# Patient Record
Sex: MALE | Race: WHITE | NOT HISPANIC OR LATINO | URBAN - METROPOLITAN AREA
[De-identification: names, ages, dates, MRNs, and addresses within clinical notes are randomized per-mention and may not be internally consistent; named-entity substitution may affect disease eponyms.]

---

## 2020-10-03 ENCOUNTER — EMERGENCY (EMERGENCY)
Facility: HOSPITAL | Age: 60
LOS: 1 days | Discharge: HOME | End: 2020-10-03
Attending: INTERNAL MEDICINE | Admitting: INTERNAL MEDICINE
Payer: COMMERCIAL

## 2020-10-03 VITALS
TEMPERATURE: 98 F | OXYGEN SATURATION: 97 % | DIASTOLIC BLOOD PRESSURE: 82 MMHG | RESPIRATION RATE: 16 BRPM | WEIGHT: 186.95 LBS | SYSTOLIC BLOOD PRESSURE: 154 MMHG | HEART RATE: 59 BPM

## 2020-10-03 DIAGNOSIS — S61.011A LACERATION WITHOUT FOREIGN BODY OF RIGHT THUMB WITHOUT DAMAGE TO NAIL, INITIAL ENCOUNTER: ICD-10-CM

## 2020-10-03 PROCEDURE — 90471 IMMUNIZATION ADMIN: CPT

## 2020-10-03 PROCEDURE — 12002 RPR S/N/AX/GEN/TRNK2.6-7.5CM: CPT

## 2020-10-03 PROCEDURE — 90715 TDAP VACCINE 7 YRS/> IM: CPT

## 2020-10-03 PROCEDURE — 12002 RPR S/N/AX/GEN/TRNK2.6-7.5CM: CPT | Mod: F5,F6

## 2020-10-03 PROCEDURE — 99283 EMERGENCY DEPT VISIT LOW MDM: CPT | Mod: 25

## 2020-10-03 RX ORDER — CEPHALEXIN 500 MG
500 CAPSULE ORAL ONCE
Refills: 0 | Status: COMPLETED | OUTPATIENT
Start: 2020-10-03 | End: 2020-10-03

## 2020-10-03 RX ORDER — TETANUS TOXOID, REDUCED DIPHTHERIA TOXOID AND ACELLULAR PERTUSSIS VACCINE, ADSORBED 5; 2.5; 8; 8; 2.5 [IU]/.5ML; [IU]/.5ML; UG/.5ML; UG/.5ML; UG/.5ML
0.5 SUSPENSION INTRAMUSCULAR ONCE
Refills: 0 | Status: COMPLETED | OUTPATIENT
Start: 2020-10-03 | End: 2020-10-03

## 2020-10-03 RX ORDER — CEPHALEXIN 500 MG
1 CAPSULE ORAL
Qty: 14 | Refills: 0
Start: 2020-10-03 | End: 2020-10-09

## 2020-10-03 RX ADMIN — TETANUS TOXOID, REDUCED DIPHTHERIA TOXOID AND ACELLULAR PERTUSSIS VACCINE, ADSORBED 0.5 MILLILITER(S): 5; 2.5; 8; 8; 2.5 SUSPENSION INTRAMUSCULAR at 21:27

## 2020-10-03 RX ADMIN — Medication 500 MILLIGRAM(S): at 22:31

## 2020-10-03 NOTE — ED PROVIDER NOTE - OBJECTIVE STATEMENT
60 y/o M h/o HTN pw laceration to the right 1st and 2nd digits obtained today at 4pm s/p use of a power tool. Denies weakness, numbness, tingling. Able to flex and extend fingers without difficulty. Last Tdap- unknown.

## 2020-10-03 NOTE — ED ADULT NURSE NOTE - NSIMPLEMENTINTERV_GEN_ALL_ED
Implemented All Universal Safety Interventions:  [place] to call system. Call bell, personal items and telephone within reach. Instruct patient to call for assistance. Room bathroom lighting operational. Non-slip footwear when patient is off stretcher. Physically safe environment: no spills, clutter or unnecessary equipment. Stretcher in lowest position, wheels locked, appropriate side rails in place.

## 2020-10-03 NOTE — ED PROVIDER NOTE - CARE PLAN
Principal Discharge DX:	Laceration of thumb without foreign body without damage to nail, unspecified laterality, initial encounter

## 2020-10-03 NOTE — ED PROVIDER NOTE - ATTENDING CONTRIBUTION TO CARE
58 y/o M h/o HTN pw laceration to the right 1st and 2nd digits obtained today at 4pm s/p use of a power tool. Denies weakness, numbness, tingling. Able to flex and extend fingers without difficulty. Last Tdap- unknown. Will repair laceration, give Tdap and start Keflex prophylaxis.  **laceration sutured   Dr. Powell:  I have reviewed and discussed with the PA/ resident the case specifics, including the history, physical assessment, evaluation, conclusion, laboratory results, and medical plan. I agree with the contents, and conclusions. I have personally examined, and interviewed the patient.

## 2020-10-03 NOTE — ED ADULT NURSE NOTE - OBJECTIVE STATEMENT
59yr old male walked into ED c/o laceration to right 1st and 2nd finger; states he grabbed a power tool at home the wrong way; site clean and dry; insure of last tetanus

## 2020-10-03 NOTE — ED PROVIDER NOTE - CLINICAL SUMMARY MEDICAL DECISION MAKING FREE TEXT BOX
58 y/o M h/o HTN pw laceration to the right 1st and 2nd digits obtained today at 4pm s/p use of a power tool. Denies weakness, numbness, tingling. Able to flex and extend fingers without difficulty. Last Tdap- unknown. Will repair laceration, give Tdap and start Keflex prophylaxis. 60 y/o M h/o HTN pw laceration to the right 1st and 2nd digits obtained today at 4pm s/p use of a power tool. Denies weakness, numbness, tingling. Able to flex and extend fingers without difficulty. Last Tdap- unknown. Will repair laceration, give Tdap and start Keflex prophylaxis.  **laceration sutured by Dr. Powell

## 2020-10-03 NOTE — ED PROVIDER NOTE - PATIENT PORTAL LINK FT
You can access the FollowMyHealth Patient Portal offered by [facility] by registering at the following website: [email or web address]. By joining [facility]’s FollowMyHealth portal, you will also be able to view your health information using other applications (apps) compatible with our system.

## 2020-10-03 NOTE — ED PROVIDER NOTE - NEURO NEGATIVE STATEMENT, MLM
Detail Level: Detailed Post-Care Instructions: I reviewed with the patient in detail post-care instructions. Patient is to wear sunprotection, and avoid picking at any of the treated lesions. Pt may apply Vaseline to crusted or scabbing areas. Duration Of Freeze Thaw-Cycle (Seconds): 2 Render Post-Care Instructions In Note?: no Consent: The patient's consent was obtained including but not limited to risks of crusting, scabbing, blistering, scarring, darker or lighter pigmentary change, recurrence, incomplete removal and infection. no loss of consciousness, no gait abnormality, no headache, no sensory deficits, and no weakness. Number Of Freeze-Thaw Cycles: 1 freeze-thaw cycle electronic

## 2020-10-03 NOTE — ED PROVIDER NOTE - NSFOLLOWUPINSTRUCTIONS_ED_ALL_ED_FT
Follow up with your PMD within 48-72 hours for wound check.   Keep sutures covered and dry for 24 hours then clean with soap and water daily, apply bacitracin and cover.  Return to the emergency department for suture removal in 7-10 days.   Take Tylenol 650mg every 4-6 hours as needed for pain   Any increased pain, surrounding redness, streaking (red lines), pus, drainage, swelling, fever, chills OR ANY NEW CONCERNING symptoms return to the emergency department.   You received a Tdap (tetanus, diphtheria and pertussis) shot today.    Laceration    WHAT YOU NEED TO KNOW:    A laceration is an injury to the skin and the soft tissue underneath it. Lacerations can happen anywhere on the body.    DISCHARGE INSTRUCTIONS:    Seek care immediately if:     You have heavy bleeding or bleeding that does not stop after 10 minutes of holding firm, direct pressure over the wound.      Your wound opens up.    Call your doctor if:     You have a fever or chills.      Your laceration is red, warm, or swollen.      You have red streaks on your skin coming from your wound.      You have white or yellow drainage from the wound that smells bad.      You have pain that gets worse, even after treatment.      You have questions or concerns about your condition or care.    Medicines: You may need any of the following:     Prescription pain medicine may be given. Ask your healthcare provider how to take this medicine safely. Some prescription pain medicines contain acetaminophen. Do not take other medicines that contain acetaminophen without talking to your healthcare provider. Too much acetaminophen may cause liver damage. Prescription pain medicine may cause constipation. Ask your healthcare provider how to prevent or treat constipation.       Antibiotics help treat or prevent a bacterial infection.      Take your medicine as directed. Contact your healthcare provider if you think your medicine is not helping or if you have side effects. Tell him or her if you are allergic to any medicine. Keep a list of the medicines, vitamins, and herbs you take. Include the amounts, and when and why you take them. Bring the list or the pill bottles to follow-up visits. Carry your medicine list with you in case of an emergency.    Care for your wound as directed:     Do not get your wound wet until your healthcare provider says it is okay. Do not soak your wound in water. Do not go swimming until your healthcare provider says it is okay. Carefully wash the wound with soap and water. Gently pat the area dry or allow it to air dry.      Change your bandages when they get wet, dirty, or after washing. Apply new, clean bandages as directed. Do not apply elastic bandages or tape too tight. Do not put powders or lotions over your incision.      Apply antibiotic ointment as directed. Your healthcare provider may give you antibiotic ointment to put over your wound if you have stitches. If you have strips of tape over your incision, let them dry up and fall off on their own. If they do not fall off within 14 days, gently remove them. If you have glue over your wound, do not remove or pick at it. If your glue comes off, do not replace it with glue that you have at home.      Check your wound every day for signs of infection, such as swelling, redness, or pus.    Self-care:     Apply ice on your wound for 15 to 20 minutes every hour or as directed. Use an ice pack, or put crushed ice in a plastic bag. Cover it with a towel. Ice helps prevent tissue damage and decreases swelling and pain.      Use a splint as directed. A splint will decrease movement and stress on your wound. It may help it heal faster. A splint may be used for lacerations over joints or areas of your body that bend. Ask your healthcare provider how to apply and remove a splint.      Decrease scarring of your wound by applying ointments as directed. Do not apply ointments until your healthcare provider says it is okay. You may need to wait until your wound is healed. Ask which ointment to buy and how often to use it. After your wound is healed, use sunscreen over the area when you are out in the sun. You should do this for at least 6 months to 1 year after your injury.    Follow up with your doctor as directed: You will need to return in 3 to 14 days to have stitches or staples removed. Write down your questions so you remember to ask them during your visits Follow up with your PMD within 48-72 hours for wound check.   Keep sutures covered and dry for 24 hours then clean with soap and water daily, apply bacitracin and cover.  Return to the emergency department for suture removal in 7-10 days.   Take Keflex 500mg 1 tab 2x/day for 7 days to prevent infection.   Take Tylenol 650mg every 4-6 hours as needed for pain   Any increased pain, surrounding redness, streaking (red lines), pus, drainage, swelling, fever, chills OR ANY NEW CONCERNING symptoms return to the emergency department.   You received a Tdap (tetanus, diphtheria and pertussis) shot today.    Laceration    WHAT YOU NEED TO KNOW:    A laceration is an injury to the skin and the soft tissue underneath it. Lacerations can happen anywhere on the body.    DISCHARGE INSTRUCTIONS:    Seek care immediately if:     You have heavy bleeding or bleeding that does not stop after 10 minutes of holding firm, direct pressure over the wound.      Your wound opens up.    Call your doctor if:     You have a fever or chills.      Your laceration is red, warm, or swollen.      You have red streaks on your skin coming from your wound.      You have white or yellow drainage from the wound that smells bad.      You have pain that gets worse, even after treatment.      You have questions or concerns about your condition or care.    Medicines: You may need any of the following:     Prescription pain medicine may be given. Ask your healthcare provider how to take this medicine safely. Some prescription pain medicines contain acetaminophen. Do not take other medicines that contain acetaminophen without talking to your healthcare provider. Too much acetaminophen may cause liver damage. Prescription pain medicine may cause constipation. Ask your healthcare provider how to prevent or treat constipation.       Antibiotics help treat or prevent a bacterial infection.      Take your medicine as directed. Contact your healthcare provider if you think your medicine is not helping or if you have side effects. Tell him or her if you are allergic to any medicine. Keep a list of the medicines, vitamins, and herbs you take. Include the amounts, and when and why you take them. Bring the list or the pill bottles to follow-up visits. Carry your medicine list with you in case of an emergency.    Care for your wound as directed:     Do not get your wound wet until your healthcare provider says it is okay. Do not soak your wound in water. Do not go swimming until your healthcare provider says it is okay. Carefully wash the wound with soap and water. Gently pat the area dry or allow it to air dry.      Change your bandages when they get wet, dirty, or after washing. Apply new, clean bandages as directed. Do not apply elastic bandages or tape too tight. Do not put powders or lotions over your incision.      Apply antibiotic ointment as directed. Your healthcare provider may give you antibiotic ointment to put over your wound if you have stitches. If you have strips of tape over your incision, let them dry up and fall off on their own. If they do not fall off within 14 days, gently remove them. If you have glue over your wound, do not remove or pick at it. If your glue comes off, do not replace it with glue that you have at home.      Check your wound every day for signs of infection, such as swelling, redness, or pus.    Self-care:     Apply ice on your wound for 15 to 20 minutes every hour or as directed. Use an ice pack, or put crushed ice in a plastic bag. Cover it with a towel. Ice helps prevent tissue damage and decreases swelling and pain.      Use a splint as directed. A splint will decrease movement and stress on your wound. It may help it heal faster. A splint may be used for lacerations over joints or areas of your body that bend. Ask your healthcare provider how to apply and remove a splint.      Decrease scarring of your wound by applying ointments as directed. Do not apply ointments until your healthcare provider says it is okay. You may need to wait until your wound is healed. Ask which ointment to buy and how often to use it. After your wound is healed, use sunscreen over the area when you are out in the sun. You should do this for at least 6 months to 1 year after your injury.    Follow up with your doctor as directed: You will need to return in 3 to 14 days to have stitches or staples removed. Write down your questions so you remember to ask them during your visits

## 2020-10-11 NOTE — ED PROCEDURE NOTE - CPROC ED WOUND CLOSURE1
Pt awake and alert, skin w/d,resps reg/unlabored. Pt states pain is 4/10 but states nausea has been coming intermittently. MD made aware and orders received. skin suture

## 2024-08-22 ENCOUNTER — APPOINTMENT (OUTPATIENT)
Dept: FAMILY MEDICINE | Facility: CLINIC | Age: 64
End: 2024-08-22
Payer: COMMERCIAL

## 2024-08-22 ENCOUNTER — NON-APPOINTMENT (OUTPATIENT)
Age: 64
End: 2024-08-22

## 2024-08-22 VITALS
DIASTOLIC BLOOD PRESSURE: 90 MMHG | HEIGHT: 71 IN | BODY MASS INDEX: 26.18 KG/M2 | TEMPERATURE: 97.6 F | RESPIRATION RATE: 16 BRPM | WEIGHT: 187 LBS | SYSTOLIC BLOOD PRESSURE: 162 MMHG | HEART RATE: 57 BPM | OXYGEN SATURATION: 98 %

## 2024-08-22 DIAGNOSIS — R10.12 LEFT UPPER QUADRANT PAIN: ICD-10-CM

## 2024-08-22 DIAGNOSIS — N52.9 MALE ERECTILE DYSFUNCTION, UNSPECIFIED: ICD-10-CM

## 2024-08-22 DIAGNOSIS — Z87.39 PERSONAL HISTORY OF OTHER DISEASES OF THE MUSCULOSKELETAL SYSTEM AND CONNECTIVE TISSUE: ICD-10-CM

## 2024-08-22 DIAGNOSIS — Z72.0 TOBACCO USE: ICD-10-CM

## 2024-08-22 DIAGNOSIS — K64.9 UNSPECIFIED HEMORRHOIDS: ICD-10-CM

## 2024-08-22 DIAGNOSIS — K21.00 GASTRO-ESOPHAGEAL REFLUX DISEASE WITH ESOPHAGITIS, WITHOUT BLEEDING: ICD-10-CM

## 2024-08-22 DIAGNOSIS — I10 ESSENTIAL (PRIMARY) HYPERTENSION: ICD-10-CM

## 2024-08-22 DIAGNOSIS — Z82.49 FAMILY HISTORY OF ISCHEMIC HEART DISEASE AND OTHER DISEASES OF THE CIRCULATORY SYSTEM: ICD-10-CM

## 2024-08-22 PROCEDURE — 99406 BEHAV CHNG SMOKING 3-10 MIN: CPT

## 2024-08-22 PROCEDURE — 99386 PREV VISIT NEW AGE 40-64: CPT

## 2024-08-22 RX ORDER — SILDENAFIL 25 MG/1
25 TABLET ORAL
Qty: 30 | Refills: 0 | Status: ACTIVE | COMMUNITY
Start: 2024-08-22 | End: 1900-01-01

## 2024-08-22 RX ORDER — OMEPRAZOLE 20 MG/1
20 CAPSULE, DELAYED RELEASE ORAL
Qty: 90 | Refills: 3 | Status: ACTIVE | COMMUNITY
Start: 2024-08-22 | End: 1900-01-01

## 2024-08-22 RX ORDER — VALSARTAN AND HYDROCHLOROTHIAZIDE 160; 25 MG/1; MG/1
160-25 TABLET, FILM COATED ORAL DAILY
Qty: 90 | Refills: 3 | Status: ACTIVE | COMMUNITY
Start: 2024-08-22 | End: 1900-01-01

## 2024-08-24 PROBLEM — R10.12 LEFT UPPER QUADRANT ABDOMINAL PAIN: Status: ACTIVE | Noted: 2024-08-22

## 2024-08-24 NOTE — COUNSELING
[Yes] : Risk of tobacco use and health benefits of smoking cessation discussed: Yes [Cessation strategies including cessation program discussed] : Cessation strategies including cessation program discussed [Use of nicotine replacement therapies and other medications discussed] : Use of nicotine replacement therapies and other medications discussed [No] : Not willing to quit smoking [FreeTextEntry1] : 4

## 2024-08-24 NOTE — HISTORY OF PRESENT ILLNESS
[de-identified] : 63-year-old male new patient establishing care for annual health maintenance and physical examination. He's a long-term smoker with a chief complaint of a recurring, low-intensity pain at the left side of his abdomen, under his ribs. He is unsure if his occupation as a  is the cause. He cannot identify specific aggravating or relieving factors, but likens it to a "pulled muscle" and says it may occur after work. 2/10 intensity, characterized "dull". He has normal bowel habits, is sexually active, and has no signs of STD. Patient has a history of hypertension and GERD, and is currently on valsartan-hydrochlorothiazide 160-25 and Prilosec 20. His reflux symptoms have resolved and he is still taking omeprazole daily. He also uses sildenafil 25mg for erectile dysfunction as needed. The patient reported no allergies to any medications. He has had a hip replacement and an inguinal hernia repair with mesh placement. Denies unexpected weight loss, difficulty swallowing, changes in bowel habits, or chest discomfort. The patient mentioned a spot on his back for which he wants to be assessed by a dermatologist. He is scheduled for a colonoscopy in October.

## 2024-08-24 NOTE — HISTORY OF PRESENT ILLNESS
[de-identified] : 63-year-old male new patient establishing care for annual health maintenance and physical examination. He's a long-term smoker with a chief complaint of a recurring, low-intensity pain at the left side of his abdomen, under his ribs. He is unsure if his occupation as a  is the cause. He cannot identify specific aggravating or relieving factors, but likens it to a "pulled muscle" and says it may occur after work. 2/10 intensity, characterized "dull". He has normal bowel habits, is sexually active, and has no signs of STD. Patient has a history of hypertension and GERD, and is currently on valsartan-hydrochlorothiazide 160-25 and Prilosec 20. His reflux symptoms have resolved and he is still taking omeprazole daily. He also uses sildenafil 25mg for erectile dysfunction as needed. The patient reported no allergies to any medications. He has had a hip replacement and an inguinal hernia repair with mesh placement. Denies unexpected weight loss, difficulty swallowing, changes in bowel habits, or chest discomfort. The patient mentioned a spot on his back for which he wants to be assessed by a dermatologist. He is scheduled for a colonoscopy in October.

## 2024-08-28 ENCOUNTER — NON-APPOINTMENT (OUTPATIENT)
Age: 64
End: 2024-08-28

## 2024-08-28 VITALS — BODY MASS INDEX: 26.18 KG/M2 | WEIGHT: 187 LBS | HEIGHT: 71 IN

## 2024-08-28 DIAGNOSIS — Z72.0 TOBACCO USE: ICD-10-CM

## 2024-08-28 NOTE — HISTORY OF PRESENT ILLNESS
[Current] : Current [TextBox_13] : Referred by Dr. Arias  Mr. JOHN PABON is a 63 year old man with a history of nicotine dependence   Over the telephone today we reviewed and confirmed that the patient meets screening eligibility criteria:  -Age: 63 years old  Smoking status  -Current smoker  -Number of pack(s) per day:1  -Number of years smoked:35  -Number of pack years smokin     Mr. PABON denies any personal history of lung cancer. Denies any s/s of lung cancer. No lung cancer in a 1st degree relative. Denies any history of lung disease. 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 [PacksperDay] : 1 [N_Years] : 35 [PacksperYear] : 35

## 2024-08-28 NOTE — REASON FOR VISIT
[Medical Office: ([facility])___] : at the medical office located in  [Verbal consent obtained from patient] : the patient, [unfilled] [Initial Evaluation] : an initial evaluation visit [Review of Eligibility] : review of eligibility [Low-Dose CT Screening Discussion] : low-dose CT lung cancer screening discussion [Virtual Visit] : virtual visit

## 2024-09-03 ENCOUNTER — APPOINTMENT (OUTPATIENT)
Dept: CT IMAGING | Facility: HOSPITAL | Age: 64
End: 2024-09-03

## 2024-09-23 ENCOUNTER — NON-APPOINTMENT (OUTPATIENT)
Age: 64
End: 2024-09-23

## 2025-09-11 ENCOUNTER — APPOINTMENT (OUTPATIENT)
Dept: FAMILY MEDICINE | Facility: CLINIC | Age: 65
End: 2025-09-11
Payer: COMMERCIAL

## 2025-09-11 ENCOUNTER — NON-APPOINTMENT (OUTPATIENT)
Age: 65
End: 2025-09-11

## 2025-09-11 VITALS
SYSTOLIC BLOOD PRESSURE: 143 MMHG | TEMPERATURE: 98.2 F | OXYGEN SATURATION: 99 % | DIASTOLIC BLOOD PRESSURE: 73 MMHG | HEART RATE: 66 BPM | WEIGHT: 188 LBS | RESPIRATION RATE: 16 BRPM | BODY MASS INDEX: 26.32 KG/M2 | HEIGHT: 71 IN

## 2025-09-11 DIAGNOSIS — K21.00 GASTRO-ESOPHAGEAL REFLUX DISEASE WITH ESOPHAGITIS, WITHOUT BLEEDING: ICD-10-CM

## 2025-09-11 DIAGNOSIS — G89.29 PAIN IN RIGHT HIP: ICD-10-CM

## 2025-09-11 DIAGNOSIS — Z72.0 TOBACCO USE: ICD-10-CM

## 2025-09-11 DIAGNOSIS — Z23 ENCOUNTER FOR IMMUNIZATION: ICD-10-CM

## 2025-09-11 DIAGNOSIS — R79.89 OTHER SPECIFIED ABNORMAL FINDINGS OF BLOOD CHEMISTRY: ICD-10-CM

## 2025-09-11 DIAGNOSIS — I10 ESSENTIAL (PRIMARY) HYPERTENSION: ICD-10-CM

## 2025-09-11 DIAGNOSIS — M25.551 PAIN IN RIGHT HIP: ICD-10-CM

## 2025-09-11 PROCEDURE — 99396 PREV VISIT EST AGE 40-64: CPT

## 2025-09-11 PROCEDURE — 99406 BEHAV CHNG SMOKING 3-10 MIN: CPT | Mod: NC

## 2025-09-14 PROBLEM — M25.551 CHRONIC PAIN OF RIGHT HIP: Status: ACTIVE | Noted: 2025-09-14
